# Patient Record
Sex: FEMALE | Race: BLACK OR AFRICAN AMERICAN | NOT HISPANIC OR LATINO | Employment: FULL TIME | URBAN - METROPOLITAN AREA
[De-identification: names, ages, dates, MRNs, and addresses within clinical notes are randomized per-mention and may not be internally consistent; named-entity substitution may affect disease eponyms.]

---

## 2021-05-02 ENCOUNTER — HOSPITAL ENCOUNTER (EMERGENCY)
Facility: HOSPITAL | Age: 36
Discharge: HOME | End: 2021-05-02
Admitting: EMERGENCY MEDICINE

## 2021-05-02 VITALS
RESPIRATION RATE: 18 BRPM | HEART RATE: 83 BPM | HEIGHT: 67 IN | BODY MASS INDEX: 26.24 KG/M2 | DIASTOLIC BLOOD PRESSURE: 88 MMHG | SYSTOLIC BLOOD PRESSURE: 126 MMHG | TEMPERATURE: 98 F | OXYGEN SATURATION: 100 % | WEIGHT: 167.2 LBS

## 2021-05-02 DIAGNOSIS — M54.50 ACUTE LOW BACK PAIN, UNSPECIFIED BACK PAIN LATERALITY, UNSPECIFIED WHETHER SCIATICA PRESENT: Primary | ICD-10-CM

## 2021-05-02 PROCEDURE — 25010000002 DEXAMETHASONE SODIUM PHOSPHATE 10 MG/ML SOLUTION: Performed by: NURSE PRACTITIONER

## 2021-05-02 PROCEDURE — 63710000001 ONDANSETRON ODT 4 MG TABLET DISPERSIBLE: Performed by: NURSE PRACTITIONER

## 2021-05-02 PROCEDURE — 25010000002 KETOROLAC TROMETHAMINE PER 15 MG: Performed by: NURSE PRACTITIONER

## 2021-05-02 PROCEDURE — 99283 EMERGENCY DEPT VISIT LOW MDM: CPT

## 2021-05-02 PROCEDURE — 96372 THER/PROPH/DIAG INJ SC/IM: CPT

## 2021-05-02 RX ORDER — MELOXICAM 15 MG/1
15 TABLET ORAL DAILY
Qty: 30 TABLET | Refills: 0 | Status: SHIPPED | OUTPATIENT
Start: 2021-05-02 | End: 2022-04-06

## 2021-05-02 RX ORDER — DEXAMETHASONE SODIUM PHOSPHATE 10 MG/ML
10 INJECTION, SOLUTION INTRAMUSCULAR; INTRAVENOUS ONCE
Status: DISCONTINUED | OUTPATIENT
Start: 2021-05-02 | End: 2021-05-02

## 2021-05-02 RX ORDER — LIDOCAINE 50 MG/G
1 PATCH TOPICAL EVERY 24 HOURS
Qty: 6 EACH | Refills: 0 | Status: SHIPPED | OUTPATIENT
Start: 2021-05-02 | End: 2022-04-06

## 2021-05-02 RX ORDER — CYCLOBENZAPRINE HCL 10 MG
10 TABLET ORAL 3 TIMES DAILY PRN
Qty: 15 TABLET | Refills: 0 | Status: SHIPPED | OUTPATIENT
Start: 2021-05-02 | End: 2021-07-28

## 2021-05-02 RX ORDER — DEXAMETHASONE SODIUM PHOSPHATE 10 MG/ML
10 INJECTION, SOLUTION INTRAMUSCULAR; INTRAVENOUS ONCE
Status: COMPLETED | OUTPATIENT
Start: 2021-05-02 | End: 2021-05-02

## 2021-05-02 RX ORDER — KETOROLAC TROMETHAMINE 30 MG/ML
30 INJECTION, SOLUTION INTRAMUSCULAR; INTRAVENOUS ONCE
Status: COMPLETED | OUTPATIENT
Start: 2021-05-02 | End: 2021-05-02

## 2021-05-02 RX ORDER — METHYLPREDNISOLONE 4 MG/1
TABLET ORAL
Qty: 21 TABLET | Refills: 0 | Status: SHIPPED | OUTPATIENT
Start: 2021-05-02 | End: 2021-07-28

## 2021-05-02 RX ORDER — ONDANSETRON 4 MG/1
4 TABLET, ORALLY DISINTEGRATING ORAL ONCE
Status: COMPLETED | OUTPATIENT
Start: 2021-05-02 | End: 2021-05-02

## 2021-05-02 RX ORDER — HYDROCODONE BITARTRATE AND ACETAMINOPHEN 5; 325 MG/1; MG/1
1 TABLET ORAL ONCE
Status: COMPLETED | OUTPATIENT
Start: 2021-05-02 | End: 2021-05-02

## 2021-05-02 RX ADMIN — KETOROLAC TROMETHAMINE 30 MG: 30 INJECTION, SOLUTION INTRAMUSCULAR; INTRAVENOUS at 10:03

## 2021-05-02 RX ADMIN — DEXAMETHASONE SODIUM PHOSPHATE 10 MG: 10 INJECTION, SOLUTION INTRAMUSCULAR; INTRAVENOUS at 10:12

## 2021-05-02 RX ADMIN — ONDANSETRON 4 MG: 4 TABLET, ORALLY DISINTEGRATING ORAL at 10:02

## 2021-05-02 RX ADMIN — HYDROCODONE BITARTRATE AND ACETAMINOPHEN 1 TABLET: 5; 325 TABLET ORAL at 10:01

## 2021-05-02 NOTE — ED PROVIDER NOTES
Subjective   Chief complaint: back pain      Context: Patient is a 36-year-old female who comes in by private vehicle ambulatory with her significant other complaining of low back pain for the last week.  She denies any injury but states she does work out every day.  She reports gradual onset.  She states the pain is worse and reproducible with range of motion.  She denies any saddle anesthesia bowel or bladder incontinence or retention.  She denies any paresthesias numbness or tingling.  She denies any urinary complaints.  She denies any fever nausea vomiting diarrhea abdominal pain or upper respiratory symptoms.  no headache or rash.  No stiff neck.  She denies any history of chemoradiation osteoporosis IV drug use or chronic steroid use.  denies any prior injury or surgical interventions to her back.  Has been taking Tylenol with mild relief    Duration: 1 week    Timing: Waxes and wanes    Severity: Moderate    Associated symptoms: Worse and reproducible with range of motion          PCP: none  LMP: Reports her last period was 3 weeks ago          Review of Systems   Constitutional: Negative for fever.   HENT: Negative.    Eyes: Negative for visual disturbance.   Respiratory: Negative.    Cardiovascular: Negative.    Gastrointestinal: Negative.    Genitourinary: Negative.    Musculoskeletal: Positive for back pain.   Skin: Negative.    Allergic/Immunologic: Negative for immunocompromised state.   Neurological: Negative.    Hematological: Does not bruise/bleed easily.   Psychiatric/Behavioral: Negative for confusion.       No past medical history on file.    No Known Allergies    No past surgical history on file.    No family history on file.    Social History     Socioeconomic History   • Marital status:      Spouse name: Not on file   • Number of children: Not on file   • Years of education: Not on file   • Highest education level: Not on file           Objective   Physical Exam     Vital signs and  wilberto nurse note reviewed.  Constitutional:  Awake, alert; well developed and well nourished.  No acute distress, significant other at bedside  HEENT:  Normocephalic, atraumatic; pupils with intact EOM; oropharynx is pink and moist without edema or erythema.  Neck: Supple, full range of motion without pain;   Cardiovascular: Regular rate and rhythm, normal S1-S2. .  Pulmonary: Respiratory effort regular, nonlabored; breath sounds CTA without wheezing or rhonchi.  Abdomen: Soft, nontender, nondistended with normoactive bowel sounds; no rebound or guarding.    Musculoskeletal: Independent range of motion of all extremities, no palpable tenderness or edema.   Back:  Spine is midline and without midline tenderness on palpation of cervical, thoracic, and lumbar spine-there is no bony step-off or crepitus; paraspinal musculature is tender over the bilateral piriformis into the bilateral SI joints and without soft tissue swelling, overlying ecchymosis or erythema. ROM elicits pain,  Distal muscle strength is 5/5.  Neuro: Alert oriented x3, speech is clear and appropriate. DTRs are symmetrical bilateral lower extremity, negative Babinski BLE, negative straight leg raise BLE, strong and equal dorsiflexion of bilateral great toes L4, L5, S1 motor sensory intact.        Procedures           ED Course      Labs Reviewed - No data to display  Medications   ketorolac (TORADOL) injection 30 mg (30 mg Intramuscular Given 5/2/21 1003)   HYDROcodone-acetaminophen (NORCO) 5-325 MG per tablet 1 tablet (1 tablet Oral Given 5/2/21 1001)   ondansetron ODT (ZOFRAN-ODT) disintegrating tablet 4 mg (4 mg Oral Given 5/2/21 1002)   dexamethasone sodium phosphate injection 10 mg (10 mg Intramuscular Given 5/2/21 1012)     No radiology results for the last day  Prior to Admission medications    Not on File                                          MDM  Number of Diagnoses or Management Options  Diagnosis management comments: Chart review: Inspect  report negative      Comorbidities:  has no past medical history on file.  Differentials: Strain not all inclusive of differentials considered  Radiology interpretation: Agrees not warranted    Appropriate PPE worn during exam.  Patient was given Toradol Decadron Zofran and Norco.  We discussed the importance of follow-up as she may need further evaluation or physical therapy.    i discussed findings with patient and significant other who voices understanding of discharge instructions, signs and symptoms requiring return to ED; discharged improved and in stable condition with follow up for re-evaluation.        Final diagnoses:   Acute low back pain, unspecified back pain laterality, unspecified whether sciatica present       ED Disposition  ED Disposition     ED Disposition Condition Comment    Discharge Stable           PATIENT [facility] [address]  [phone]  Schedule an appointment as soon as possible for a visit            Medication List      New Prescriptions    cyclobenzaprine 10 MG tablet  Commonly known as: FLEXERIL  Take 1 tablet by mouth 3 (Three) Times a Day As Needed for Muscle Spasms for up to 15 doses.     lidocaine 5 %  Commonly known as: Lidoderm  Place 1 patch on the skin as directed by provider Daily. Remove & Discard patch within 12 hours or as directed by MD     meloxicam 15 MG tablet  Commonly known as: MOBIC  Take 1 tablet by mouth Daily.     methylPREDNISolone 4 MG dose pack  Commonly known as: MEDROL  Take as directed on package instructions.           Where to Get Your Medications      These medications were sent to [id number] [place], [place] - [address] [facility] AT [facility] & [address] - [phone] [facility] [phone] fx [address] [place] [address] [place] [address]    Phone: [phone]   · cyclobenzaprine 10 MG tablet  · lidocaine 5 %  · meloxicam 15 MG tablet  · methylPREDNISolone 4 MG dose pack          Mary Cho  E, APRN  05/02/21 1018

## 2021-07-28 ENCOUNTER — HOSPITAL ENCOUNTER (EMERGENCY)
Facility: HOSPITAL | Age: 36
Discharge: HOME | End: 2021-07-28
Admitting: EMERGENCY MEDICINE

## 2021-07-28 ENCOUNTER — APPOINTMENT (OUTPATIENT)
Dept: GENERAL RADIOLOGY | Facility: HOSPITAL | Age: 36
End: 2021-07-28

## 2021-07-28 VITALS
HEART RATE: 71 BPM | RESPIRATION RATE: 18 BRPM | BODY MASS INDEX: 26.61 KG/M2 | OXYGEN SATURATION: 99 % | HEIGHT: 67 IN | TEMPERATURE: 98 F | DIASTOLIC BLOOD PRESSURE: 84 MMHG | SYSTOLIC BLOOD PRESSURE: 135 MMHG | WEIGHT: 169.53 LBS

## 2021-07-28 DIAGNOSIS — M25.511 ACUTE PAIN OF RIGHT SHOULDER: Primary | ICD-10-CM

## 2021-07-28 DIAGNOSIS — S46.911A STRAIN OF RIGHT SHOULDER, INITIAL ENCOUNTER: ICD-10-CM

## 2021-07-28 DIAGNOSIS — M79.642 LEFT HAND PAIN: ICD-10-CM

## 2021-07-28 PROCEDURE — 73030 X-RAY EXAM OF SHOULDER: CPT

## 2021-07-28 PROCEDURE — 63710000001 PREDNISONE PER 5 MG: Performed by: NURSE PRACTITIONER

## 2021-07-28 PROCEDURE — 99283 EMERGENCY DEPT VISIT LOW MDM: CPT

## 2021-07-28 PROCEDURE — 73130 X-RAY EXAM OF HAND: CPT

## 2021-07-28 RX ORDER — METHOCARBAMOL 500 MG/1
500 TABLET, FILM COATED ORAL 4 TIMES DAILY PRN
Qty: 20 TABLET | Refills: 0 | Status: SHIPPED | OUTPATIENT
Start: 2021-07-28 | End: 2022-04-06

## 2021-07-28 RX ORDER — HYDROCODONE BITARTRATE AND ACETAMINOPHEN 7.5; 325 MG/1; MG/1
1 TABLET ORAL ONCE
Status: COMPLETED | OUTPATIENT
Start: 2021-07-28 | End: 2021-07-28

## 2021-07-28 RX ORDER — DICLOFENAC SODIUM 75 MG/1
75 TABLET, DELAYED RELEASE ORAL 2 TIMES DAILY PRN
Qty: 20 TABLET | Refills: 0 | Status: SHIPPED | OUTPATIENT
Start: 2021-07-28 | End: 2022-04-06

## 2021-07-28 RX ORDER — PREDNISONE 20 MG/1
20 TABLET ORAL DAILY
Qty: 5 TABLET | Refills: 0 | Status: SHIPPED | OUTPATIENT
Start: 2021-07-28 | End: 2022-04-06

## 2021-07-28 RX ORDER — METHOCARBAMOL 500 MG/1
500 TABLET, FILM COATED ORAL ONCE
Status: COMPLETED | OUTPATIENT
Start: 2021-07-28 | End: 2021-07-28

## 2021-07-28 RX ADMIN — PREDNISONE 60 MG: 10 TABLET ORAL at 02:36

## 2021-07-28 RX ADMIN — METHOCARBAMOL 500 MG: 500 TABLET, FILM COATED ORAL at 02:36

## 2021-07-28 RX ADMIN — HYDROCODONE BITARTRATE AND ACETAMINOPHEN 1 TABLET: 7.5; 325 TABLET ORAL at 02:36

## 2022-04-06 ENCOUNTER — OFFICE VISIT (OUTPATIENT)
Dept: SURGERY | Facility: CLINIC | Age: 37
End: 2022-04-06

## 2022-04-06 VITALS
TEMPERATURE: 98.2 F | DIASTOLIC BLOOD PRESSURE: 79 MMHG | OXYGEN SATURATION: 99 % | RESPIRATION RATE: 16 BRPM | HEART RATE: 73 BPM | SYSTOLIC BLOOD PRESSURE: 121 MMHG | WEIGHT: 164.6 LBS | HEIGHT: 67 IN | BODY MASS INDEX: 25.83 KG/M2

## 2022-04-06 DIAGNOSIS — N63.21 MASS OF UPPER OUTER QUADRANT OF LEFT BREAST: Primary | ICD-10-CM

## 2022-04-06 PROCEDURE — 99204 OFFICE O/P NEW MOD 45 MIN: CPT | Performed by: SURGERY

## 2022-04-06 NOTE — PROGRESS NOTES
GENERAL SURGERY CONSULTATION NOTE    Consult requested by: Dr. Hills    Patient Care Team:  Magaly Lopez APRN as PCP - General (Nurse Practitioner)    Reason for consult: Left breast lump    Subjective     Patient is a 36 y.o. female presents with a palpable lump in her left breast which has been present for the past few months.  The patient states that this area is painful, especially around the time of her periods.  She notices a tender knot in the lateral aspect of the left breast.  She is not having any skin changes, deformities, or discharge from the nipple.  The patient had previously undergone a screening mammography on 10/15/2021 for baseline study after her mother passed away due to stage IV triple negative breast cancer at the age of 56.  At that time, she was found to have no mammographic evidence of malignancy.  Then, few months later she developed the symptoms above.  She then underwent a diagnostic mammogram and ultrasound on the left breast on 3/4/2022.  This demonstrated dense breast tissue in the area of her palpable concern.  Although no definite mass was noted, it was thought that due to the dense breast tissue she may benefit from MRI.  Therefore, she was sent to me for consideration for surgical management of her breast pain.  Patient's past medical history is significant for no prior breast operations.  She reports that she starting her periods at age 13, her last period was on April 1, 2022.  She has had 3 pregnancies and 2 live births with 1 miscarriage at 11 weeks.  Her first child was born when she was 16, and she breast-fed both children for approximately 6 weeks.  She took birth control pills for approximately 2 months, and Depo-Provera shot for a year before undergoing a tubal ligation in 2006.  She has never taken any hormone replacement therapy.  Her family history is significant for her mother who unfortunately passed away recently due to triple negative, stage IV breast cancer  at the age of 56.  She has a maternal grandmother who also passed away a few years ago with pancreatic cancer diagnosed in her 70s, and a maternal great uncle who had colon issues which she is unsure if they were cancer or not.     Review of Systems   Constitutional: Negative for appetite change, chills and fever.   HENT: Negative for congestion and sore throat.    Respiratory: Negative for cough and shortness of breath.    Cardiovascular: Negative for chest pain and palpitations.   Gastrointestinal: Negative for abdominal pain, constipation, diarrhea, nausea, vomiting and GERD.   Genitourinary: Positive for breast lump and breast pain. Negative for breast discharge, difficulty urinating, dysuria and frequency.   Musculoskeletal: Negative for arthralgias and back pain.   Skin: Negative for rash and skin lesions.   Neurological: Negative for dizziness, seizures and memory problem.   Hematological: Negative for adenopathy. Does not bruise/bleed easily.   Psychiatric/Behavioral: Negative for sleep disturbance and depressed mood.        History  History reviewed. No pertinent past medical history.  Past Surgical History:   Procedure Laterality Date   • LAPAROSCOPIC TUBAL LIGATION Bilateral 2006     Family History   Problem Relation Age of Onset   • Hypertension Mother    • Breast cancer Mother         passed away 09/2021   • Anxiety disorder Mother    • No Known Problems Father    • Pancreatic cancer Maternal Grandmother      Social History     Tobacco Use   • Smoking status: Former Smoker     Types: Cigarettes     Quit date: 2012     Years since quitting: 10.2   • Smokeless tobacco: Never Used   Substance Use Topics   • Alcohol use: Yes     Comment: social   • Drug use: Not Currently     (Not in a hospital admission)    Allergies:  Patient has no known allergies.    Objective     Vital Signs  Temp:  [98.2 °F (36.8 °C)] 98.2 °F (36.8 °C)  Heart Rate:  [73] 73  Resp:  [16] 16  BP: (121)/(79) 121/79    Physical  Exam  Vitals reviewed. Exam conducted with a chaperone present.   Constitutional:       Appearance: She is well-developed.   HENT:      Head: Normocephalic and atraumatic.   Eyes:      Pupils: Pupils are equal, round, and reactive to light.   Cardiovascular:      Rate and Rhythm: Normal rate and regular rhythm.   Pulmonary:      Effort: Pulmonary effort is normal.      Breath sounds: Normal breath sounds.   Chest:   Breasts:      Right: No axillary adenopathy or supraclavicular adenopathy.      Left: No axillary adenopathy or supraclavicular adenopathy.        Comments: Both breasts were examined the upright and supine position.  Both breasts exhibit normal shape and contour.  Both breasts have extensive fibrocystic breast changes with firm nodularities bilaterally.  There are no well-defined masses, and no obvious breast deformities.  There is no discharge from the nipple.  There is no evidence of any skin changes or skin thickening.  Abdominal:      General: There is no distension.      Palpations: Abdomen is soft.      Tenderness: There is no abdominal tenderness.      Hernia: No hernia is present.   Musculoskeletal:         General: Normal range of motion.      Cervical back: Normal range of motion.   Lymphadenopathy:      Cervical: No cervical adenopathy.      Upper Body:      Right upper body: No supraclavicular or axillary adenopathy.      Left upper body: No supraclavicular or axillary adenopathy.   Skin:     General: Skin is warm and dry.      Findings: No rash.   Neurological:      Mental Status: She is alert and oriented to person, place, and time.         Results Review:   Lab Results (last 24 hours)     ** No results found for the last 24 hours. **        No radiology results for the last day      I reviewed the patient's new imaging results and agree with the interpretation.  I reviewed the patient's other test results and agree with the interpretation    Assessment/Plan     Active Problems:  Family  history of breast cancer  Palpable left breast mass    After examining the patient and seeing her imaging, I do think there would be some benefit to obtaining an MRI of her breast to rule out abnormality.  Discussed with the patient that if she has negative imaging on mammogram, ultrasound, and MRI, then I would not recommend any surgical intervention.  However if an abnormality is detected on MRI we will work it up as appropriately.  I will get a bilateral MRI of her breasts and call her with these results.  Given her family history of multiple cancers, and especially an aggressive tumor in her mother, I think she would benefit from evaluation in the high risk breast clinic.  Especially with her dense breast tissue, she may be someone who benefits from more frequent screening and using MRI to help detect breast cancer at an early age.  Therefore, I will make a referral to the high risk breast clinic.  We lastly discussed her mastalgia.  I reviewed with the patient the symptoms of mastodynia, and we discussed treatment options for this.    #1 I recommend being professionally fitted for a bra.  We discussed that often times women wear an ill fitting bra, and this puts tension on Job's ligaments which increases breast tenderness.  Therefore having a professionally fitted bra to allow for proper support will help significantly with regards to their breast discomfort.  #2 we discussed cessation of smoking and reduction of caffeine intake.  Both of these have been linked to increased in breast pain.  #3 we discussed using evening primrose oil as directed by the  for at least 1 month in an effort to decrease their overall breast pain.    I discussed the patients findings and my recommendations with the patient who understands and agrees.     Derek Lantigua MD  04/06/22  14:40 EDT

## 2022-04-08 ENCOUNTER — TELEPHONE (OUTPATIENT)
Dept: ONCOLOGY | Facility: CLINIC | Age: 37
End: 2022-04-08

## 2022-04-08 NOTE — TELEPHONE ENCOUNTER
Hub is instructed to read the documentation below to patient  ATTEMPTED TO CONTACT PATIENT REGARDING ONCOLOGY REFERRAL.  NO ANSWER.  LMV ASKING0 PATIENT TO CALL BACK.

## 2022-04-12 ENCOUNTER — TELEPHONE (OUTPATIENT)
Dept: ONCOLOGY | Facility: CLINIC | Age: 37
End: 2022-04-12

## 2022-04-12 NOTE — TELEPHONE ENCOUNTER
Hub is instructed to read the documentation below to patient  ATTEMPTED TO CONTACT PATIENT REGARDING ONCOLOGY REFERRAL.  NO ANSWER.  LMV ASKING PATIENT TO CALL BACK.

## 2022-04-13 ENCOUNTER — PATIENT ROUNDING (BHMG ONLY) (OUTPATIENT)
Dept: ONCOLOGY | Facility: CLINIC | Age: 37
End: 2022-04-13

## 2022-04-13 ENCOUNTER — APPOINTMENT (OUTPATIENT)
Dept: LAB | Facility: HOSPITAL | Age: 37
End: 2022-04-13

## 2022-04-13 ENCOUNTER — CONSULT (OUTPATIENT)
Dept: ONCOLOGY | Facility: CLINIC | Age: 37
End: 2022-04-13
Payer: COMMERCIAL

## 2022-04-13 VITALS
HEIGHT: 67 IN | RESPIRATION RATE: 18 BRPM | SYSTOLIC BLOOD PRESSURE: 123 MMHG | HEART RATE: 74 BPM | DIASTOLIC BLOOD PRESSURE: 83 MMHG | WEIGHT: 165 LBS | BODY MASS INDEX: 25.9 KG/M2 | TEMPERATURE: 97.1 F

## 2022-04-13 DIAGNOSIS — N63.21 MASS OF UPPER OUTER QUADRANT OF LEFT BREAST: Primary | ICD-10-CM

## 2022-04-13 PROCEDURE — 36415 COLL VENOUS BLD VENIPUNCTURE: CPT | Performed by: INTERNAL MEDICINE

## 2022-04-13 NOTE — PROGRESS NOTES
Hematology/Oncology Outpatient Consultation    Patient name: Jodi Nunes  : 1985  MRN: [id number]  Primary Care Physician: Magaly Lopez APRN  Referring Physician: Magaly Lopez APRN  Reason For Consult:     Chief Complaint   Patient presents with   • Appointment     Family history of breast cancer       History of Present Illness:    This is a 36-year-old female who has been referred secondary to strong family history of malignancy.  Patient's mother developed breast cancer at age of 56, triple negative breast cancer and  from metastatic disease 2 years ago.  Her maternal grandmother had pancreatic cancer at age of 70.  Her maternal great uncle also had colonic problems type unclear.  She has noticed left breast lump over the past 2 months.  For that reason she was seen by Dr. Lantigua her mammogram and ultrasound had been negative but breast MRI was recommended to be done in approximately 2 to 3 weeks from now.  She has no prior history of breast biopsies    Patient is also interested in pursuing cancer genetics for her own risk management.    She is  and has 2 children.  She does not drink alcohol.  She has an administrative job    History reviewed. No pertinent past medical history.    Past Surgical History:   Procedure Laterality Date   • LAPAROSCOPIC TUBAL LIGATION Bilateral        No current outpatient medications on file.    No Known Allergies      There is no immunization history on file for this patient.    Family History   Problem Relation Age of Onset   • Hypertension Mother    • Breast cancer Mother         passed away 2021   • Anxiety disorder Mother    • No Known Problems Father    • Pancreatic cancer Maternal Grandmother        Cancer-related family history includes Breast cancer in her mother; Pancreatic cancer in her maternal grandmother.    Social History     Tobacco Use   • Smoking status: Former Smoker     Types: Cigarettes     Quit date: 2012     Years  "since quitting: 10.2   • Smokeless tobacco: Never Used   Substance Use Topics   • Alcohol use: Yes     Comment: social   • Drug use: Not Currently       ROS:    Review of Systems   Constitutional: Negative for chills, fatigue and fever.   HENT: Negative for congestion, drooling, ear discharge, rhinorrhea, sinus pressure and tinnitus.    Eyes: Negative for photophobia, pain and discharge.   Respiratory: Negative for apnea, choking and stridor.    Cardiovascular: Negative for palpitations.   Gastrointestinal: Negative for abdominal distention, abdominal pain and anal bleeding.   Endocrine: Negative for polydipsia and polyphagia.   Genitourinary: Negative for decreased urine volume, flank pain and genital sores.   Musculoskeletal: Negative for gait problem, neck pain and neck stiffness.   Skin: Negative for color change, rash and wound.   Neurological: Negative for tremors, seizures, syncope, facial asymmetry and speech difficulty.   Hematological: Negative for adenopathy.   Psychiatric/Behavioral: Negative for agitation, confusion, hallucinations and self-injury. The patient is not hyperactive.        Objective:    Vitals:    04/13/22 1250   BP: 123/83   Pulse: 74   Resp: 18   Temp: 97.1 °F (36.2 °C)   TempSrc: Infrared   Weight: 74.8 kg (165 lb)   Height: 170.2 cm (67\")   PainSc: 0-No pain     Body mass index is 25.84 kg/m².    ECOG  (0) Fully active, able to carry on all predisease performance without restriction    Physical Exam:  Physical Exam  Vitals and nursing note reviewed.   Constitutional:       General: She is not in acute distress.     Appearance: She is not diaphoretic.   HENT:      Head: Normocephalic and atraumatic.   Eyes:      General: No scleral icterus.        Right eye: No discharge.         Left eye: No discharge.      Conjunctiva/sclera: Conjunctivae normal.   Neck:      Thyroid: No thyromegaly.   Cardiovascular:      Rate and Rhythm: Normal rate and regular rhythm.      Heart sounds: Normal " heart sounds.     No friction rub. No gallop.   Pulmonary:      Effort: Pulmonary effort is normal. No respiratory distress.      Breath sounds: No stridor. No wheezing.   Abdominal:      General: Bowel sounds are normal.      Palpations: Abdomen is soft. There is no mass.      Tenderness: There is no abdominal tenderness. There is no guarding or rebound.   Musculoskeletal:         General: No tenderness. Normal range of motion.      Cervical back: Normal range of motion and neck supple.   Lymphadenopathy:      Cervical: No cervical adenopathy.   Skin:     General: Skin is warm.      Findings: No erythema or rash.   Neurological:      Mental Status: She is alert and oriented to person, place, and time.      Motor: No abnormal muscle tone.   Psychiatric:         Behavior: Behavior normal.         RECENT LABS  No results found for: WBC, RBC, HGB, HCT, MCV, MCH, MCHC, RDW, RDWSD, MPV, PLT, NEUTRORELPCT, LYMPHORELPCT, MONORELPCT, EOSRELPCT, BASORELPCT, AUTOIGPER, NEUTROABS, LYMPHSABS, MONOSABS, EOSABS, BASOSABS, AUTOIGNUM, NRBC    No results found for: GLUCOSE, BUN, CREATININE, EGFRIFNONA, EGFRIFAFRI, BCR, K, CO2, CALCIUM, PROTENTOTREF, ALBUMIN, LABIL2, BILIRUBIN, AST, ALT      Assessment/Plan   Mass of upper outer quadrant of left breast  - Genetic Testing - Blood,  - Genetic Testing - Blood,      1. Family history of breast cancer in her mother triple negative at the age of 56.  Her mother  from metastatic disease.  Maternal grandmother with pancreatic cancer at age of 70.  Patient is interested in pursuing cancer genetics for her own risk management  2. Left breast lump, currently being evaluated by Dr. Lantigua  3. Genetic counseling and testing      Discussion      Today's risk assessment is based on the history the patient has provided.  We discussed that the best people to screen are the ones who have been affected by malignancy as their result is more informative.  We reviewed all the hallmarks of  hereditary cancer syndrome including multiple relatives on the same side of the family being affected by malignancy, cancer diagnosis in young individuals, different cancers in one individual.      We discussed the implications of a positive deleterious mutation which can result in recommendations for prophylactic surgeries, chemoprevention, lifestyle modifications and aggressive screening with mammogram and MRI, and also increased breast awareness and breast self exams.  Patient understands if she screens positive, then at-risk family members will need to be screened as well.  Each offspring has a 50% chance of inheriting a deleterious mutation if positive in a parent.      A negative deleterious mutation will make hereditary cancer syndrome much less likely, but does not rule out the possibility of a gene mutation that cannot be detected with the available technology.  She also understands that a negative gene test result might indicate that the cancers that occurred in her family were most likely sporadic, or even if there is a mutation the patient did not inherit it.     We discussed variant of unknown significance.  Patient understands that no medical decisions will be made based on a variant of unknown significance, but I did stress the importance of maintaining contact information with us should this be the case.      We discussed the financial implications of the above testing.  Patient understands when the right clinical criteria are met that most insurance companies will cover the cost.      We also discussed the various insurability issues with a deleterious mutation result.     Patient has give us consent to proceed with comprehensive genetic analysis.              Plans    · Comprehensive gene test with [facility]  · Follow-up 6 weeks  · All questions answered      I spent 60 total minutes, face-to-face, caring for Jodi today.  80% of this time involved counseling and/or coordination of care as  documented within this note.

## 2022-04-13 NOTE — PROGRESS NOTES
April 13, 2022    Hello, may I speak with Jodi Nunes?    My name is Beba Soto      I am  with [facility]  [address] [facility] [place] [address] [phone].    Before we get started may I verify your date of birth? 1985    I am calling to officially welcome you to our practice and ask about your recent visit. Is this a good time to talk? no    Tell me about your visit with us. What things went well?  A My Chart message was sent to the patient.       We're always looking for ways to make our patients' experiences even better. Do you have recommendations on ways we may improve?  no    Overall were you satisfied with your first visit to our practice? yes       I appreciate you taking the time to speak with me today. Is there anything else I can do for you? no      Thank you, and have a great day.

## 2022-05-05 ENCOUNTER — APPOINTMENT (OUTPATIENT)
Dept: MRI IMAGING | Facility: HOSPITAL | Age: 37
End: 2022-05-05

## 2022-05-09 LAB
REF LAB TEST METHOD: NORMAL
WHOLE BLOOD SORT: NORMAL

## 2022-05-23 ENCOUNTER — APPOINTMENT (OUTPATIENT)
Dept: LAB | Facility: HOSPITAL | Age: 37
End: 2022-05-23

## 2024-03-11 ENCOUNTER — TRANSCRIBE ORDERS (OUTPATIENT)
Dept: ADMINISTRATIVE | Facility: HOSPITAL | Age: 39
End: 2024-03-11
Payer: COMMERCIAL

## 2024-03-11 ENCOUNTER — LAB (OUTPATIENT)
Dept: LAB | Facility: HOSPITAL | Age: 39
End: 2024-03-11
Payer: COMMERCIAL

## 2024-03-11 DIAGNOSIS — Z01.818 OTHER SPECIFIED PRE-OPERATIVE EXAMINATION: ICD-10-CM

## 2024-03-11 DIAGNOSIS — Z01.818 OTHER SPECIFIED PRE-OPERATIVE EXAMINATION: Primary | ICD-10-CM

## 2024-03-11 LAB — HCG INTACT+B SERPL-ACNC: <1 MIU/ML

## 2024-03-11 PROCEDURE — 36415 COLL VENOUS BLD VENIPUNCTURE: CPT

## 2024-03-11 PROCEDURE — 84702 CHORIONIC GONADOTROPIN TEST: CPT

## 2024-03-15 ENCOUNTER — LAB REQUISITION (OUTPATIENT)
Dept: LAB | Facility: HOSPITAL | Age: 39
End: 2024-03-15
Payer: COMMERCIAL

## 2024-03-15 DIAGNOSIS — R87.613 HIGH GRADE SQUAMOUS INTRAEPITHELIAL LESION ON CYTOLOGIC SMEAR OF CERVIX (HGSIL): ICD-10-CM

## 2024-03-15 PROCEDURE — 88307 TISSUE EXAM BY PATHOLOGIST: CPT | Performed by: OBSTETRICS & GYNECOLOGY

## 2024-03-15 PROCEDURE — 88305 TISSUE EXAM BY PATHOLOGIST: CPT | Performed by: OBSTETRICS & GYNECOLOGY

## 2024-03-18 LAB
LAB AP CASE REPORT: NORMAL
PATH REPORT.FINAL DX SPEC: NORMAL
PATH REPORT.GROSS SPEC: NORMAL

## 2024-12-19 ENCOUNTER — TRANSCRIBE ORDERS (OUTPATIENT)
Dept: ADMINISTRATIVE | Facility: HOSPITAL | Age: 39
End: 2024-12-19
Payer: COMMERCIAL

## 2024-12-19 DIAGNOSIS — Z80.3 FAMILY HISTORY OF BREAST CANCER: ICD-10-CM

## 2024-12-19 DIAGNOSIS — R22.31 AXILLARY LUMP, RIGHT: Primary | ICD-10-CM

## 2025-03-05 ENCOUNTER — PATIENT ROUNDING (BHMG ONLY) (OUTPATIENT)
Dept: URGENT CARE | Facility: CLINIC | Age: 40
End: 2025-03-05
Payer: COMMERCIAL

## 2025-03-05 NOTE — ED NOTES
Thank you for letting us care for you in your recent visit to our urgent care center. We would love to hear about your experience with us. Was this the first time you have visited our location?    We’re always looking for ways to make our patients’ experiences even better. Do you have any recommendations on ways we may improve?     I appreciate you taking the time to respond. Please be on the lookout for a survey about your recent visit from [employer] via text or email. We would greatly appreciate if you could fill that out and turn it back in. We want your voice to be heard and we value your feedback.   Thank you for choosing [facility] for your healthcare needs.